# Patient Record
Sex: MALE | Race: WHITE | NOT HISPANIC OR LATINO | Employment: UNEMPLOYED | URBAN - NONMETROPOLITAN AREA
[De-identification: names, ages, dates, MRNs, and addresses within clinical notes are randomized per-mention and may not be internally consistent; named-entity substitution may affect disease eponyms.]

---

## 2020-09-16 ENCOUNTER — APPOINTMENT (OUTPATIENT)
Dept: OCCUPATIONAL MEDICINE | Facility: OTHER | Age: 25
End: 2020-09-16

## 2020-09-16 PROCEDURE — 99080 SPECIAL REPORTS OR FORMS: CPT

## 2020-09-16 PROCEDURE — 99199 UNLISTED SPECIAL SVC PX/RPRT: CPT

## 2021-09-30 ENCOUNTER — APPOINTMENT (OUTPATIENT)
Dept: OCCUPATIONAL MEDICINE | Facility: OTHER | Age: 26
End: 2021-09-30

## 2021-09-30 PROCEDURE — 99199 UNLISTED SPECIAL SVC PX/RPRT: CPT

## 2021-09-30 PROCEDURE — 99080 SPECIAL REPORTS OR FORMS: CPT

## 2022-11-01 ENCOUNTER — HOSPITAL ENCOUNTER (EMERGENCY)
Facility: HOSPITAL | Age: 27
Discharge: HOME | End: 2022-11-01
Attending: NURSE PRACTITIONER | Admitting: NURSE PRACTITIONER

## 2022-11-01 VITALS
SYSTOLIC BLOOD PRESSURE: 162 MMHG | TEMPERATURE: 98.2 F | HEART RATE: 81 BPM | OXYGEN SATURATION: 97 % | RESPIRATION RATE: 16 BRPM | DIASTOLIC BLOOD PRESSURE: 100 MMHG

## 2022-11-01 DIAGNOSIS — L03.113 CELLULITIS OF RIGHT ELBOW: Primary | ICD-10-CM

## 2022-11-01 PROCEDURE — 99213 OFFICE O/P EST LOW 20 MIN: CPT | Performed by: NURSE PRACTITIONER

## 2022-11-01 PROCEDURE — G0463 HOSPITAL OUTPT CLINIC VISIT: HCPCS

## 2022-11-01 RX ORDER — SULFAMETHOXAZOLE/TRIMETHOPRIM 800-160 MG
1 TABLET ORAL 2 TIMES DAILY
Qty: 20 TABLET | Refills: 0 | Status: SHIPPED | OUTPATIENT
Start: 2022-11-01 | End: 2022-11-08

## 2022-11-01 ASSESSMENT — ENCOUNTER SYMPTOMS
DIARRHEA: 0
CHILLS: 0
NAUSEA: 0
PSYCHIATRIC NEGATIVE: 1
VOMITING: 0
WOUND: 1
SHORTNESS OF BREATH: 0
FEVER: 0

## 2022-11-02 NOTE — ED TRIAGE NOTES
Reports right elbow started to swell a few hours ago. Reports its been painful but is more worries about the swelling.  Hurt it about a week ago pt fell

## 2022-11-02 NOTE — ED TRIAGE NOTES
Patient presents to urgent care with c/o a wound on his elbow. States its painful and hot to the touch, swelling. States he hurt it a week ago and it was healing and in the last couple of hours its swollen up huge.

## 2022-11-02 NOTE — DISCHARGE INSTRUCTIONS
Bactrim as ordered  - Take entire course of antibiotic even if you start to feel better.  - Antibiotics can cause stomach upset including nausea and diarrhea. Read your bottle or ask the pharmacist if antibiotic can be taken with food to help prevent nausea. If you have symptoms of diarrhea you can take an over-the-counter probiotic and/or increase foods with probiotics such as yogurt, [place], sauerkraut.    Follow-up with primary care provider or return to urgent care/ED with any worsening in condition or additional concerns.

## 2022-11-02 NOTE — ED PROVIDER NOTES
History     Chief Complaint   Patient presents with     Cellulitis     HPI  Marbin Tatum is a 27 year old male who presents to urgent care today ambulatory with complaints of cellulitis to right elbow.  Patient was hiking last week and fell on some rocks causing an abrasion.  Redness, swelling and tenderness started today.  Full range of motion to right upper extremity.  Denies any fever, chills, nausea, vomiting, diarrhea, shortness of breath or chest pain.  No other concerns.    Allergies:  No Known Allergies    Problem List:    There are no problems to display for this patient.       Past Medical History:    No past medical history on file.    Past Surgical History:    No past surgical history on file.    Family History:    No family history on file.    Social History:  Marital Status:  Single [1]        Medications:    sulfamethoxazole-trimethoprim (BACTRIM DS) 800-160 MG tablet      Review of Systems   Constitutional: Negative for chills and fever.   Respiratory: Negative for shortness of breath.    Cardiovascular: Negative for chest pain.   Gastrointestinal: Negative for diarrhea, nausea and vomiting.   Musculoskeletal: Negative for gait problem.   Skin: Positive for wound (Right elbow).   Psychiatric/Behavioral: Negative.      Physical Exam   BP: 162/100  Pulse: 81  Temp: 98.2  F (36.8  C)  Resp: 16  SpO2: 97 %    Physical Exam  Vitals and nursing note reviewed.   Constitutional:       General: He is not in acute distress.     Appearance: Normal appearance. He is not ill-appearing or toxic-appearing.   Cardiovascular:      Rate and Rhythm: Normal rate and regular rhythm.      Pulses: Normal pulses.      Heart sounds: Normal heart sounds.   Pulmonary:      Effort: Pulmonary effort is normal.      Breath sounds: Normal breath sounds.   Musculoskeletal:         General: Normal range of motion.   Skin:     General: Skin is warm and dry.      Capillary Refill: Capillary refill takes less than 2 seconds.       Comments: Prior abrasions to right forearm just below elbow with surrounding cellulitis which is red, warm and tender to the touch.  No drainage.  No abscess.  Outline cellulitis with surgical pen.   Neurological:      Mental Status: He is alert.   Psychiatric:         Mood and Affect: Mood normal.           ED Course     No results found for this or any previous visit (from the past 24 hour(s)).    Medications - No data to display    Assessments & Plan (with Medical Decision Making)     I have reviewed the nursing notes.    I have reviewed the findings, diagnosis, plan and need for follow up with the patient.  (L03.113) Cellulitis of right elbow  (primary encounter diagnosis)  Plan:   Bactrim as ordered  - Take entire course of antibiotic even if you start to feel better.  - Antibiotics can cause stomach upset including nausea and diarrhea. Read your bottle or ask the pharmacist if antibiotic can be taken with food to help prevent nausea. If you have symptoms of diarrhea you can take an over-the-counter probiotic and/or increase foods with probiotics such as yogurt, [place], sauerkraut.    Follow-up with primary care provider or return to urgent care/ED with any worsening in condition or additional concerns.    Discharge Medication List as of 11/1/2022  7:33 PM      START taking these medications    Details   sulfamethoxazole-trimethoprim (BACTRIM DS) 800-160 MG tablet Take 1 tablet by mouth 2 times daily for 7 days, Disp-20 tablet, R-0, [facility]           Final diagnoses:   Cellulitis of right elbow     11/1/2022   [facility]     Melia Shi NP  11/01/22 1944